# Patient Record
Sex: FEMALE | Race: WHITE | NOT HISPANIC OR LATINO | Employment: OTHER | URBAN - NONMETROPOLITAN AREA
[De-identification: names, ages, dates, MRNs, and addresses within clinical notes are randomized per-mention and may not be internally consistent; named-entity substitution may affect disease eponyms.]

---

## 2017-03-07 ENCOUNTER — COMMUNICATION - GICH (OUTPATIENT)
Dept: FAMILY MEDICINE | Facility: OTHER | Age: 32
End: 2017-03-07

## 2017-03-07 DIAGNOSIS — B85.0 PEDICULOSIS DUE TO PEDICULUS HUMANUS CAPITIS: ICD-10-CM

## 2017-03-10 ENCOUNTER — COMMUNICATION - GICH (OUTPATIENT)
Dept: FAMILY MEDICINE | Facility: OTHER | Age: 32
End: 2017-03-10

## 2017-03-10 DIAGNOSIS — B85.0 PEDICULOSIS DUE TO PEDICULUS HUMANUS CAPITIS: ICD-10-CM

## 2017-03-13 ENCOUNTER — COMMUNICATION - GICH (OUTPATIENT)
Dept: FAMILY MEDICINE | Facility: OTHER | Age: 32
End: 2017-03-13

## 2017-03-13 DIAGNOSIS — B85.0 PEDICULOSIS DUE TO PEDICULUS HUMANUS CAPITIS: ICD-10-CM

## 2017-09-20 ENCOUNTER — HISTORY (OUTPATIENT)
Dept: FAMILY MEDICINE | Facility: OTHER | Age: 32
End: 2017-09-20

## 2017-09-20 ENCOUNTER — OFFICE VISIT - GICH (OUTPATIENT)
Dept: FAMILY MEDICINE | Facility: OTHER | Age: 32
End: 2017-09-20

## 2017-09-20 DIAGNOSIS — B97.89 OTHER VIRAL AGENTS AS THE CAUSE OF DISEASES CLASSIFIED ELSEWHERE: ICD-10-CM

## 2017-09-20 DIAGNOSIS — J06.9 ACUTE UPPER RESPIRATORY INFECTION: ICD-10-CM

## 2017-12-28 NOTE — PROGRESS NOTES
Patient Information     Patient Name MRN Cassandra Kelly [id number] Female 1985      Progress Notes by Karen Alicea NP at 2017 11:00 AM     Author:  Karen Alicea NP Service:  (none) Author Type:  PHYS- Nurse Practitioner     Filed:  2017 11:39 AM Encounter Date:  2017 Status:  Signed     :  Karen Alicea NP (PHYS- Nurse Practitioner)            Nursing Notes:   Ana Lake  2017 11:34 AM  Signed  Patient presents today for sore throat that started this morning.  Ana Lake LPN..............2017 11:19 AM    SUBJECTIVE:    Cassandra Kelly is a 32 y.o. female who presents for sore throat started this AM    Pharyngitis    This is a new problem. The current episode started today. The problem has been unchanged. Neither side of throat is experiencing more pain than the other. There has been no fever. The pain is mild. Associated symptoms include congestion. Pertinent negatives include no coughing, diarrhea, ear discharge, ear pain, headaches, hoarse voice, plugged ear sensation, neck pain, shortness of breath, stridor, swollen glands, trouble swallowing or vomiting. She has had no exposure to strep or mono. She has tried NSAIDs and cool liquids for the symptoms. The treatment provided significant relief.       No current outpatient prescriptions on file prior to visit.     No current facility-administered medications on file prior to visit.     and   Patient Active Problem List       Diagnosis  Date Noted     Family history of thyroid cancer  2016     Back pain  2013     Left 5 s1 disk        I U D REMOVAL/CHECK  2011     ALLERGIC RHINITIS  2010     INSERTION OF INTRAUTERINE CONTRACEPTIVE DEVICE  2010       REVIEW OF SYSTEMS:  Review of Systems   HENT: Positive for congestion. Negative for ear discharge, ear pain, hoarse voice and trouble swallowing.    Respiratory: Negative for cough, shortness of breath and  "stridor.    Gastrointestinal: Negative for diarrhea and vomiting.   Musculoskeletal: Negative for neck pain.   Neurological: Negative for headaches.       OBJECTIVE:  /68  Pulse 72  Temp 98.3  F (36.8  C) (Oral)  Ht 1.797 m (5' 10.75\")  Wt 82.1 kg (181 lb)  LMP  (LMP Unknown)  Breastfeeding? No  BMI 25.42 kg/m2    EXAM:   Physical Exam   Constitutional: She is oriented to person, place, and time and well-developed, well-nourished, and in no distress.   HENT:   Head: Normocephalic and atraumatic.   Right Ear: Tympanic membrane and ear canal normal.   Left Ear: Tympanic membrane and ear canal normal.   Nose: Rhinorrhea present.   Mouth/Throat: Uvula is midline and mucous membranes are normal. Posterior oropharyngeal erythema present. No oropharyngeal exudate or posterior oropharyngeal edema.   Eyes: Conjunctivae are normal.   Neck: Neck supple.   Cardiovascular: Normal rate, regular rhythm and normal heart sounds.    Pulmonary/Chest: Effort normal and breath sounds normal. No respiratory distress. She has no wheezes. She has no rales.   Lymphadenopathy:     She has no cervical adenopathy.   Neurological: She is alert and oriented to person, place, and time.   Skin: Skin is warm and dry. No rash noted.   Psychiatric: Mood and affect normal.   Nursing note and vitals reviewed.      ASSESSMENT/PLAN:    ICD-10-CM    1. Viral URI J06.9      B97.89         Plan:  We discussed s/s and what's going around the community. Given she just woke up with the sore throat, advised otc. Likely viral. Home cares and OTC gone over. Symptoms likely due to virus. No antibiotic is needed at this time. Symptoms typically worse on days 2-5 and then stabilize and you are sick for days 5-12. Days 12-14 there is slow resolution and if there is a cough, studies show it can linger longer, however one is not as ill as in the beginning. If symptoms begin worsening or fail to improve after 14 days, return to clinic for reevaluation. All " questions were answered and she is in agreement with plan.       DAVID ALBARRAN NP ....................  9/20/2017   11:39 AM

## 2017-12-29 NOTE — PATIENT INSTRUCTIONS
Patient Information     Patient Name MRCassandra Rosario [id number] Female 1985      Patient Instructions by Karen Alicea NP at 2017 11:00 AM     Author:  Karen Alicea NP Service:  (none) Author Type:  PHYS- Nurse Practitioner     Filed:  2017 11:34 AM Encounter Date:  2017 Status:  Signed     :  Karen Alicea NP (PHYS- Nurse Practitioner)            Cold Medicines   What are cold medicines?  Symptoms of the common cold start gradually over several days and usually last about two weeks. Symptoms may include sneezing, a stuffy or runny nose, sore throat, cough, watery eyes, mild headache, or body aches. A cold will go away on its own without treatment. However, there are many nonprescription products that may help relieve some of the symptoms of a cold. Cold medicines often contain more than one ingredient and are used to treat more than one symptom. Read the labels and buy products that have only the ingredients that you need. If you are not sure which medicine is best, ask your pharmacist.  How do they work?  Decongestants reduce swelling in your nose and sinuses. They may also lessen the amount of mucus made by your nose. If you use decongestants more often than directed, your stuffy nose may get worse.   Antihistamines block the effect of histamine. Histamine is a chemical your body makes when you have an allergic reaction. Antihistamines are most often used to treat itchy or watery eyes or a stuffy or runny nose caused by an allergy. Antihistamines may not help a stuffy or runny nose caused by a cold because they can make mucus thick and dry.  Mucolytics are medicines that make mucus thinner so that it is easier to cough up out of your throat and lungs.  Expectorants are cough medicines that may help to keep the mucus thin and bring up mucus from the lungs when you cough. This may relieve chest congestion and make it easier to breathe.   Cough suppressants  (antitussives) are medicines that lessen the urge to cough. They may give relief from a dry, hacking cough. If you have a cough that is wet sounding and produces mucus, it is important for you to cough the mucus up out of your lungs. For this reason, cough suppressants are not recommended for a wet sounding cough.  Fever and pain relievers, such as acetaminophen, aspirin, or other nonsteroidal anti-inflammatory drugs (NSAIDs), are often included in cold medicine. Read labels carefully to avoid taking more medicine than you need.  What else do I need to know about this medicine?    Talk to your healthcare provider if your symptoms start suddenly or you have severe symptoms. This may mean you have something more serious than a cold.    Follow the directions that come with your medicine, including information about food or alcohol. Make sure you know how and when to take your medicine. Do not take more or less than you are supposed to take.    Try to get all of your medicine at the same place. Your pharmacist can help make sure that all of your medicines are safe to take together.    Keep a list of your medicines with you. List all of the prescription medicines, nonprescription medicines, supplements, natural remedies, and vitamins that you take. Tell all healthcare providers who treat you about all of the products you are taking.    Many medicines have side effects. A side effect is a symptom or problem that is caused by the medicine. Ask your healthcare provider or pharmacist what side effects the medicine may cause and what you should do if you have side effects.    Nonsteroidal anti-inflammatory medicines (NSAIDs), such as ibuprofen, naproxen, and aspirin, may cause stomach bleeding and other problems. These risks increase with age. Read the label and take as directed. Unless recommended by your healthcare provider, do not take for more than 10 days for any reason.    Acetaminophen may cause liver damage or other  problems. Unless recommended by your provider, don't take more than 3000 milligrams (mg) in 24 hours. To make sure you don t take too much, check other medicines you take to see if they also contain acetaminophen. Ask your provider if you need to avoid drinking alcohol while taking this medicine.  If you have any questions, ask your healthcare provider or pharmacist for more information. Be sure to keep all appointments for provider visits or tests.      Symptoms likely due to virus. No antibiotic is needed at this time. Symptoms typically worse on days 2-5 and then stabilize and you are sick for days 5-12. Days 12-14 there is slow resolution and if there is a cough, studies show it can linger longer, however one is not as ill as in the beginning. If symptoms begin worsening or fail to improve after 14 days, return to clinic for reevaluation.

## 2017-12-30 NOTE — NURSING NOTE
Patient Information     Patient Name MRN Cassandra Kelly [id number] Female 1985      Nursing Note by Ana Lake at 2017 11:00 AM     Author:  Ana Lake Service:  (none) Author Type:  (none)     Filed:  2017 11:34 AM Encounter Date:  2017 Status:  Signed     :  Ana Lake            Patient presents today for sore throat that started this morning.  Ana Lake LPN..............2017 11:19 AM

## 2018-01-03 NOTE — TELEPHONE ENCOUNTER
Patient Information     Patient Name MRCassandra Rosario [id number] Female 1985      Telephone Encounter by Eva Cee at 3/10/2017  4:11 PM     Author:  Eva Cee Service:  (none) Author Type:  (none)     Filed:  3/10/2017  4:14 PM Encounter Date:  3/10/2017 Status:  Signed     :  Eva Cee            Per [employer] the Lindane is not covered by her insurance. Either Permethrin or Piperonyl Butoxide/Pyrethrin are covered by Care. Would you like to switch?

## 2018-01-03 NOTE — TELEPHONE ENCOUNTER
Patient Information     Patient Name MRN Cassandra Kelly [id number] Female 1985      Telephone Encounter by Belgica Kennedy MD at 3/10/2017  4:17 PM     Author:  Belgica Kennedy MD Service:  (none) Author Type:  Physician     Filed:  3/10/2017  4:19 PM Encounter Date:  3/10/2017 Status:  Signed     :  Belgica Kennedy MD (Physician)            She has already tried permethrin, which didn't work.  I sent a prescription for the Rid (Pyrethrins-piperonyl butoxide) instead.  If this doesn't work, will need to do a prior authorization to try to get lindane instead.  Belgica Kennedy MD ....................  3/10/2017   4:18 PM

## 2018-01-03 NOTE — TELEPHONE ENCOUNTER
Patient Information     Patient Name MRN Cassandra Kelly [id number] Female 1985      Telephone Encounter by Eva Cee at 3/10/2017  4:23 PM     Author:  Eva Cee Service:  (none) Author Type:  (none)     Filed:  3/10/2017  4:24 PM Encounter Date:  3/10/2017 Status:  Signed     :  Eva Cee            Patient notified of Dr Miles Velásquez's response.

## 2018-01-03 NOTE — TELEPHONE ENCOUNTER
Patient Information     Patient Name MRN Cassandra Kelly [id number] Female 1985      Telephone Encounter by Meenu Leo at 3/10/2017  9:49 AM     Author:  Meenu Leo Service:  (none) Author Type:  (none)     Filed:  3/10/2017  9:52 AM Encounter Date:  3/7/2017 Status:  Signed     :  Meenu Leo            After birth date was verified, patient notified that prescription was sent in.  Answered her questions, but she wanted enough to treat multiple times.  I told her this was not ordered this way.  She needs to get help to pick the nits, states she doesn't have help.  Encouraged her to ask a friend for help.  Needs to bag and freeze, treat, wash, or replace items in house.  Meenu Leo [facility] ([facility])................ 3/10/2017 9:52 AM

## 2018-01-03 NOTE — TELEPHONE ENCOUNTER
Patient Information     Patient Name MRN Cassandra Kelly [id number] Female 1985      Telephone Encounter by Belgica Kennedy MD at 3/10/2017  9:36 AM     Author:  Belgica Kennedy MD Service:  (none) Author Type:  Physician     Filed:  3/10/2017  9:36 AM Encounter Date:  3/7/2017 Status:  Signed     :  Belgica Kennedy MD (Physician)            Prescription for Lindane shampoo sent to [facility].  Belgica Kennedy MD ....................  3/10/2017   9:36 AM

## 2018-01-03 NOTE — TELEPHONE ENCOUNTER
Patient Information     Patient Name MRN Cassandra Kelly [id number] Female 1985      Telephone Encounter by Meenu Leo at 3/13/2017 11:43 AM     Author:  Meenu Leo Service:  (none) Author Type:  (none)     Filed:  3/13/2017 12:04 PM Encounter Date:  3/13/2017 Status:  Signed     :  Meenu Leo            Call placed to [facility] to check on the status of the prescription sent in today.  The RID went through last week, but the patient did not pick it up.  The 5% only comes in a topical cream.  OTC permethrin is 1%.  They will contact the patient to  the RID and we will see how that treatment works.  Meenu Leo CMA ([facility])................ 3/13/2017 12:04 PM

## 2018-01-03 NOTE — TELEPHONE ENCOUNTER
Patient Information     Patient Name Cassandra Moran [id number] Female 1985      Telephone Encounter by Meenu Leo at 3/13/2017  8:38 AM     Author:  Meenu Leo Service:  (none) Author Type:  (none)     Filed:  3/13/2017  8:40 AM Encounter Date:  3/13/2017 Status:  Signed     :  Meenu Leo            Fax from [facility] that RID is not being covered, insurance wants permethrin 5% used first.  Per previous phone note, Belgica Kennedy MD would like to do a PA.  Form in in box.  Meenu Leo CMA ([facility])................ 3/13/2017 8:40 AM

## 2018-01-03 NOTE — TELEPHONE ENCOUNTER
"Patient Information     Patient Name MRN Cassandra Kelly [id number] Female 1985      Telephone Encounter by Sandra Devries RN at 3/7/2017  9:38 AM     Author:  Sandra Devries RN Service:  (none) Author Type:  NURS- Registered Nurse     Filed:  3/7/2017  9:47 AM Encounter Date:  3/7/2017 Status:  Signed     :  Sandra Devries RN (NURS- Registered Nurse)            Pt requests physician consideration and a callback today please    Sandra Devries RN ....................  3/7/2017   9:46 AM  Answer Assessment - Initial Assessment Questions  1. LICE APPEARANCE: \"Have you seen any lice?\" If so, ask: \"What do they look like?\" (Correct answer: A gray bug that's 1/16 inch or 2 millimeters long)       My children don't have head lice anymore. I have been dealing with this since December. There is nobody to pick the nits out of my hair. I have the comb but I can't get them all. I have tried all the OTC treatments and wash all the bedding. Is there a prescription strength treatment that can be called into [facility]?  2. NITS APPEARANCE: \"Have you seen any eggs (nits) in the hair?\" If so, ask: \"What do they look like?\" (Correct answer: white or tan eggs attached to hair shafts)      Yes I have the nits-- my kids don't anymore  3. ONSET: \"How long have the eggs been present?\"       Nits   4. ITCH: \"Is the scalp itchy?\" If so, ask: \"How bad is the itch?\"       Yes - not horrible   5. RASH: \"Is there a rash?\" If so, ask: \"What does it look like?\"       no  6. TREATMENT:  What treatment have you tried?  What happened?      OTC- Nix Ultra, Lice shampoo, Lice free,  7. PREGNANCY-BREASTFEEDING: \"Is there any chance you are pregnant?\" \"When was your last menstrual period?\" \"Are you breastfeeding?\"      No    Protocols used: ADULT HEAD LICE-A-AH            "

## 2018-01-03 NOTE — TELEPHONE ENCOUNTER
Patient Information     Patient Name MRCassandra Rosario [id number] Female 1985      Telephone Encounter by Belgica Kennedy MD at 3/13/2017 11:18 AM     Author:  Belgica Kennedy MD Service:  (none) Author Type:  Physician     Filed:  3/13/2017 11:21 AM Encounter Date:  3/13/2017 Status:  Signed     :  Belgica Kennedy MD (Physician)            I had sent in a prescription for the RID (pyrethrins-piperonyl butoxide) last week as she has already tried over the counter permethrin, which wasn't effective.  I was told that either of these would be covered.  I sent in a prescription for the permethrin 5% as well as I don't know what the strength of the over the counter version was that she tried.  If this was also 5%, I would recommend that the try the RID that was sent in last week.  If this also doesn't work, I will do a prior authorization for the Lindane.  Put prior authorization for Lindane back in outbox and did not complete it.  Belgica Kennedy MD ....................  3/13/2017   11:21 AM

## 2018-01-26 VITALS
DIASTOLIC BLOOD PRESSURE: 68 MMHG | SYSTOLIC BLOOD PRESSURE: 112 MMHG | TEMPERATURE: 98.3 F | BODY MASS INDEX: 25.34 KG/M2 | HEIGHT: 71 IN | HEART RATE: 72 BPM | WEIGHT: 181 LBS

## 2018-02-16 ENCOUNTER — DOCUMENTATION ONLY (OUTPATIENT)
Dept: FAMILY MEDICINE | Facility: OTHER | Age: 33
End: 2018-02-16

## 2018-03-23 ENCOUNTER — OFFICE VISIT (OUTPATIENT)
Dept: FAMILY MEDICINE | Facility: OTHER | Age: 33
End: 2018-03-23
Attending: FAMILY MEDICINE
Payer: COMMERCIAL

## 2018-03-23 VITALS
HEIGHT: 71 IN | HEART RATE: 76 BPM | BODY MASS INDEX: 25.2 KG/M2 | WEIGHT: 180 LBS | DIASTOLIC BLOOD PRESSURE: 80 MMHG | SYSTOLIC BLOOD PRESSURE: 118 MMHG

## 2018-03-23 DIAGNOSIS — Z83.438 FAMILY HISTORY OF HYPERLIPIDEMIA: ICD-10-CM

## 2018-03-23 DIAGNOSIS — Z13.0 SCREENING, DEFICIENCY ANEMIA, IRON: ICD-10-CM

## 2018-03-23 DIAGNOSIS — Z20.7 EXPOSURE TO HEAD LICE: ICD-10-CM

## 2018-03-23 DIAGNOSIS — Z80.8 FAMILY HISTORY OF THYROID CANCER: ICD-10-CM

## 2018-03-23 DIAGNOSIS — Z00.00 HEALTH CARE MAINTENANCE: Primary | ICD-10-CM

## 2018-03-23 LAB
ANION GAP SERPL CALCULATED.3IONS-SCNC: 6 MMOL/L (ref 3–14)
BASOPHILS # BLD AUTO: 0.1 10E9/L (ref 0–0.2)
BASOPHILS NFR BLD AUTO: 0.6 %
BUN SERPL-MCNC: 16 MG/DL (ref 7–25)
CALCIUM SERPL-MCNC: 9.5 MG/DL (ref 8.6–10.3)
CHLORIDE SERPL-SCNC: 103 MMOL/L (ref 98–107)
CHOLEST SERPL-MCNC: 182 MG/DL
CO2 SERPL-SCNC: 27 MMOL/L (ref 21–31)
CREAT SERPL-MCNC: 0.71 MG/DL (ref 0.6–1.2)
DIFFERENTIAL METHOD BLD: NORMAL
EOSINOPHIL # BLD AUTO: 0.1 10E9/L (ref 0–0.7)
EOSINOPHIL NFR BLD AUTO: 1.1 %
ERYTHROCYTE [DISTWIDTH] IN BLOOD BY AUTOMATED COUNT: 13 % (ref 10–15)
GFR SERPL CREATININE-BSD FRML MDRD: >90 ML/MIN/1.7M2
GLUCOSE SERPL-MCNC: 102 MG/DL (ref 70–105)
HCT VFR BLD AUTO: 39.7 % (ref 35–47)
HDLC SERPL-MCNC: 60 MG/DL (ref 23–92)
HGB BLD-MCNC: 12.9 G/DL (ref 11.7–15.7)
IMM GRANULOCYTES # BLD: 0 10E9/L (ref 0–0.4)
IMM GRANULOCYTES NFR BLD: 0.3 %
LDLC SERPL CALC-MCNC: 112 MG/DL
LYMPHOCYTES # BLD AUTO: 2.2 10E9/L (ref 0.8–5.3)
LYMPHOCYTES NFR BLD AUTO: 27.5 %
MCH RBC QN AUTO: 26.5 PG (ref 26.5–33)
MCHC RBC AUTO-ENTMCNC: 32.5 G/DL (ref 31.5–36.5)
MCV RBC AUTO: 82 FL (ref 78–100)
MONOCYTES # BLD AUTO: 0.3 10E9/L (ref 0–1.3)
MONOCYTES NFR BLD AUTO: 4.2 %
NEUTROPHILS # BLD AUTO: 5.2 10E9/L (ref 1.6–8.3)
NEUTROPHILS NFR BLD AUTO: 66.3 %
NONHDLC SERPL-MCNC: 122 MG/DL
PLATELET # BLD AUTO: 346 10E9/L (ref 150–450)
POTASSIUM SERPL-SCNC: 4.1 MMOL/L (ref 3.5–5.1)
RBC # BLD AUTO: 4.86 10E12/L (ref 3.8–5.2)
SODIUM SERPL-SCNC: 136 MMOL/L (ref 134–144)
TRIGL SERPL-MCNC: 50 MG/DL
TSH SERPL DL<=0.05 MIU/L-ACNC: 2.59 IU/ML (ref 0.34–5.6)
WBC # BLD AUTO: 7.9 10E9/L (ref 4–11)

## 2018-03-23 PROCEDURE — 85025 COMPLETE CBC W/AUTO DIFF WBC: CPT | Performed by: FAMILY MEDICINE

## 2018-03-23 PROCEDURE — 80061 LIPID PANEL: CPT | Performed by: FAMILY MEDICINE

## 2018-03-23 PROCEDURE — 36415 COLL VENOUS BLD VENIPUNCTURE: CPT | Performed by: FAMILY MEDICINE

## 2018-03-23 PROCEDURE — 84443 ASSAY THYROID STIM HORMONE: CPT | Performed by: FAMILY MEDICINE

## 2018-03-23 PROCEDURE — 99395 PREV VISIT EST AGE 18-39: CPT | Performed by: FAMILY MEDICINE

## 2018-03-23 PROCEDURE — 80048 BASIC METABOLIC PNL TOTAL CA: CPT | Performed by: FAMILY MEDICINE

## 2018-03-23 ASSESSMENT — ENCOUNTER SYMPTOMS
COUGH: 0
RHINORRHEA: 0
FEVER: 0
FATIGUE: 0

## 2018-03-23 NOTE — PROGRESS NOTES
SUBJECTIVE:   Cassandra Kelly is a 33 year old female who presents to clinic today for a physical.  She has a family history of hyperlipidemia (dad) and thyroid cancer (mom) and would like to have some screening labs.  When she has donated blood in the past year or two, she has occasionally been turned away due to borderline hemoglobin.  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      Some of her kids have recently had head lice and she asks if I would look in her hair to see if she might have it as well.  She has occasionally had some itching of her scalp, but nothing significant recently.    HPI      Patient Active Problem List    Diagnosis Date Noted     Family history of hyperlipidemia 2018     Priority: Medium     Family history of thyroid cancer 2016     Priority: Medium     Backache 2013     Priority: Medium     Overview:   Left 5 s1 disk       Allergic rhinitis 2010     Priority: Medium     Past Medical History:   Diagnosis Date     Personal history of other medical treatment (CODE)       x 3      Past Surgical History:   Procedure Laterality Date     BACK SURGERY      No Comments Provided     DILATION AND CURETTAGE      for miscarage     TUBAL LIGATION  2015    Bilateral, also removal of intrauterine device     Family History   Problem Relation Age of Onset     CANCER Mother      Cancer,thyroid     Hypertension Mother      Hypertension     Hypertension Father      Hypertension     Family History Negative Sister      Good Health     Family History Negative Brother      Good Health,2 1/2 brothers, both in good health     Social History   Substance Use Topics     Smoking status: Never Smoker     Smokeless tobacco: Never Used     Alcohol use 1.8 oz/week     Social History     Social History Narrative    . 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     No current outpatient  "prescriptions on file.     Allergies   Allergen Reactions     Sulfa Drugs Rash     Had with chicken pox as child and they got worse       Review of Systems   Constitutional: Negative for fatigue and fever.   HENT: Negative for rhinorrhea.    Respiratory: Negative for cough.    Psychiatric/Behavioral: Negative for mood changes.        OBJECTIVE:     /80 (BP Location: Right arm, Patient Position: Sitting, Cuff Size: Adult Regular)  Pulse 76  Ht 5' 10.5\" (1.791 m)  Wt 180 lb (81.6 kg)  BMI 25.46 kg/m2  Body mass index is 25.46 kg/(m^2).  Physical Exam   Constitutional: She is oriented to person, place, and time. She appears well-developed and well-nourished. No distress.   HENT:   Head: Normocephalic.   Right Ear: Tympanic membrane and external ear normal.   Left Ear: Tympanic membrane and external ear normal.   Nose: Nose normal.   Mouth/Throat: Oropharynx is clear and moist. No oropharyngeal exudate.   Eyes: Conjunctivae are normal. Pupils are equal, round, and reactive to light. Right eye exhibits no discharge. Left eye exhibits no discharge.   Neck: Neck supple. No tracheal deviation present. No thyromegaly present.   Cardiovascular: Normal rate, regular rhythm, S1 normal, S2 normal, normal heart sounds, intact distal pulses and normal pulses.  Exam reveals no gallop, no S3, no S4 and no friction rub.    No murmur heard.  Pulmonary/Chest: Effort normal and breath sounds normal. No respiratory distress. She has no wheezes. She has no rales.   Breast exam:  No masses palpable.  No skin changes, tethering or axillary lymphadenopathy.   Abdominal: Soft. Bowel sounds are normal. She exhibits no distension and no mass. There is no hepatosplenomegaly. There is no tenderness.   Genitourinary: No breast swelling, tenderness or discharge.   Genitourinary Comments: Pelvic/Rectal exams deferred per patient.   Musculoskeletal: Normal range of motion. She exhibits no edema.   Lymphadenopathy:     She has no cervical " adenopathy.   Neurological: She is alert and oriented to person, place, and time. She has normal strength and normal reflexes. She exhibits normal muscle tone.   Skin: Skin is warm and dry. No rash noted.   No lice or nits noted in hair/scalp.   Psychiatric: She has a normal mood and affect. Judgment and thought content normal. Cognition and memory are normal.       PHQ-2 Score:     PHQ-2 ( 1999 [employer]) 3/23/2018   Q1: Little interest or pleasure in doing things 0   Q2: Feeling down, depressed or hopeless 0   PHQ-2 Score 0         Diagnostic Test Results:  none     ASSESSMENT/PLAN:       ICD-10-CM    1. Health care maintenance Z00.00    2. Family history of hyperlipidemia Z83.49 Lipid Profile     Basic metabolic panel  (Ca, Cl, CO2, Creat, Gluc, K, Na, BUN)   3. Family history of thyroid cancer Z80.8 TSH GH   4. Screening, deficiency anemia, iron Z13.0 CBC with platelets and differential   5. Exposure to head lice Z20.7        1.  Pap Smear is up to date (2016).  Since last Pap Smear and HPV were noted, 5 year follow up would be recommended.  Labs as noted below.  Vaccines are up to date.  2.  Labs as above.  3.  Labs as above.  4.  Complete Blood Count as above.  5.  No evidence for active infestation at this time.    Belgica Kennedy MD  [facility]

## 2018-03-23 NOTE — MR AVS SNAPSHOT
"              After Visit Summary   3/23/2018    Cassandra Kelly    MRN: [id number]           Patient Information     Date Of Birth          1985        Visit Information        Provider Department      3/23/2018 11:00 AM Belgica Kennedy MD [facility]        Today's [facility]     Health care maintenance    -  1    Family history of hyperlipidemia        Family history of thyroid cancer        Screening, deficiency anemia, iron           Follow-ups after your visit        Who to contact     If you have questions or need follow up information about today's clinic visit or your schedule please contact [facility] AND [facility] directly at [phone].  Normal or non-critical lab and imaging results will be communicated to you by [employer], letter or phone within 4 business days after the clinic has received the results. If you do not hear from us within 7 days, please contact the clinic through [employer] or phone. If you have a critical or abnormal lab result, we will notify you by phone as soon as possible.  Submit refill requests through [employer] or call your pharmacy and they will forward the refill request to us. Please allow 3 business days for your refill to be completed.          Additional Information About Your Visit        MyChart Information     [employer] lets you send messages to your doctor, view your test results, renew your prescriptions, schedule appointments and more. To sign up, go to [email or web address] . Click on \"Log in\" on the left side of the screen, which will take you to the Welcome page. Then click on \"Sign up Now\" on the right side of the page.     You will be asked to enter the access code listed below, as well as some personal information. Please follow the directions to create your username and password.     Your access code is: [email or web address]  Expires: 2018 11:33 AM     Your access code will  in 90 days. If you need help or a new code, " "please call your [facility] clinic or [phone].        Care EveryWhere ID     This is your Care EveryWhere ID. This could be used by other organizations to access your [facility] medical records  [id number]        Your Vitals Were     Pulse Height BMI (Body Mass Index)             76 5' 10.5\" (1.791 m) 25.46 kg/m2          Blood Pressure from Last 3 Encounters:   03/23/18 118/80   09/20/17 112/68   01/25/16 132/80    Weight from Last 3 Encounters:   03/23/18 180 lb (81.6 kg)   09/20/17 181 lb (82.1 kg)   01/25/16 181 lb (82.1 kg)              We Performed the Following     Basic metabolic panel  (Ca, Cl, CO2, Creat, Gluc, K, Na, BUN)     CBC with platelets and differential     Lipid Profile     [facility]        Primary Care Provider Office Phone # Fax #    Belgica Tressa Kennedy [phone]       [address] [facility] [address]        Equal Access to Services     [facility] Center[facility]ARRISON : Hadii aad ku hadasho Soomaali, waaxda luqadaha, qaybta kaalmada adeegyalester, ej fuentes . So [facility] [phone].    ATENCIÓN: Si habla español, tiene a doshi disposición servicios gratuitos de asistencia lingüística. Llame al [phone].    We comply with applicable federal civil rights laws and [place] laws. We do not discriminate on the basis of race, color, national origin, age, disability, sex, sexual orientation, or gender identity.            Thank you!     Thank you for choosing [facility] AND [facility]  for your care. Our goal is always to provide you with excellent care. Hearing back from our patients is one way we can continue to improve our services. Please take a few minutes to complete the written survey that you may receive in the mail after your visit with us. Thank you!             Your Updated Medication List - Protect others around you: Learn how to safely use, store and throw away your medicines at www.disposemymeds.org.      Notice  As of 3/23/2018 11:33 AM "    You have not been prescribed any medications.

## 2018-03-23 NOTE — LETTER
Cassandra Kelly  [address]  [place] [address]    3/29/2018      Dear Ms. Kelly,      I wanted to let you know about your recent labs.  Your LDL cholesterol was mildly elevated, but the remainder of your lipids were in good range.  Healthy eating and exercise would be recommended.    All of your other labs were normal.    Please contact us at [phone] with any questions or concerns that you have.    I have attached your lab results for your records.        Sincerely,         Belgica Kenneyd     Resulted Orders   TSH GH   Result Value Ref Range    Thyrotropin 2.59 0.34 - 5.60 IU/mL   Lipid Profile   Result Value Ref Range    Cholesterol 182 <200 mg/dL    Triglycerides 50 <150 mg/dL    HDL Cholesterol 60 23 - 92 mg/dL    LDL Cholesterol Calculated 112 (H) <100 mg/dL      Comment:      Above desirable:  100-129 mg/dl  Borderline High:  130-159 mg/dL  High:             160-189 mg/dL  Very high:       >189 mg/dl      Non HDL Cholesterol 122 <130 mg/dL   Basic metabolic panel  (Ca, Cl, CO2, Creat, Gluc, K, Na, BUN)   Result Value Ref Range    Sodium 136 134 - 144 mmol/L    Potassium 4.1 3.5 - 5.1 mmol/L    Chloride 103 98 - 107 mmol/L    Carbon Dioxide 27 21 - 31 mmol/L    Anion Gap 6 3 - 14 mmol/L    Glucose 102 70 - 105 mg/dL    Urea Nitrogen 16 7 - 25 mg/dL    Creatinine 0.71 0.60 - 1.20 mg/dL    GFR Estimate >90 >60 mL/min/1.7m2    GFR Estimate If Black >90 >60 mL/min/1.7m2    Calcium 9.5 8.6 - 10.3 mg/dL   CBC with platelets and differential   Result Value Ref Range    WBC 7.9 4.0 - 11.0 10e9/L    RBC Count 4.86 3.8 - 5.2 10e12/L    Hemoglobin 12.9 11.7 - 15.7 g/dL    Hematocrit 39.7 35.0 - 47.0 %    MCV 82 78 - 100 fl    MCH 26.5 26.5 - 33.0 pg    MCHC 32.5 31.5 - 36.5 g/dL    RDW 13.0 10.0 - 15.0 %    Platelet Count 346 150 - 450 10e9/L    Diff Method Automated Method     % Neutrophils 66.3 %    % Lymphocytes 27.5 %    % Monocytes 4.2 %    % Eosinophils 1.1 %    % Basophils 0.6 %    % Immature  Granulocytes 0.3 %    Absolute Neutrophil 5.2 1.6 - 8.3 10e9/L    Absolute Lymphocytes 2.2 0.8 - 5.3 10e9/L    Absolute Monocytes 0.3 0.0 - 1.3 10e9/L    Absolute Eosinophils 0.1 0.0 - 0.7 10e9/L    Absolute Basophils 0.1 0.0 - 0.2 10e9/L    Abs Immature Granulocytes 0.0 0 - 0.4 10e9/L

## 2018-11-30 ENCOUNTER — OFFICE VISIT (OUTPATIENT)
Dept: FAMILY MEDICINE | Facility: OTHER | Age: 33
End: 2018-11-30
Attending: NURSE PRACTITIONER
Payer: COMMERCIAL

## 2018-11-30 VITALS
SYSTOLIC BLOOD PRESSURE: 122 MMHG | HEART RATE: 76 BPM | BODY MASS INDEX: 24.63 KG/M2 | DIASTOLIC BLOOD PRESSURE: 76 MMHG | WEIGHT: 174.13 LBS

## 2018-11-30 DIAGNOSIS — N64.4 BREAST PAIN, LEFT: Primary | ICD-10-CM

## 2018-11-30 DIAGNOSIS — R00.0 RACING HEART BEAT: ICD-10-CM

## 2018-11-30 PROCEDURE — 93010 ELECTROCARDIOGRAM REPORT: CPT | Performed by: INTERNAL MEDICINE

## 2018-11-30 PROCEDURE — 99214 OFFICE O/P EST MOD 30 MIN: CPT | Performed by: NURSE PRACTITIONER

## 2018-11-30 PROCEDURE — G0463 HOSPITAL OUTPT CLINIC VISIT: HCPCS | Mod: 25

## 2018-11-30 PROCEDURE — 93005 ELECTROCARDIOGRAM TRACING: CPT | Performed by: NURSE PRACTITIONER

## 2018-11-30 PROCEDURE — G0463 HOSPITAL OUTPT CLINIC VISIT: HCPCS

## 2018-11-30 ASSESSMENT — PAIN SCALES - GENERAL: PAINLEVEL: NO PAIN (0)

## 2018-11-30 NOTE — PROGRESS NOTES
HPI:    Cassandra Kelly is a 33 year old female who presents to clinic today for multiple concerns.     She has had left side breast pain for 2 months. Has changed bras to see if this helps. Pain has been steady. Pain is dull and gradually worsening. No changes with period. She has done a breast exam. Feels she may be having some firmness to her breast. No fevers. No redness or swelling. No nipple discharge. No breast cancer in the family.     Also has had racing heart on and off for the past 8 months. Notices this is worse/more noticeable with stress. She is very busy in the summer and notices this more during this time. She has the episodes and will rest and this settles down. Has not had any episodes over past 3 weeks. When she did have the episodes over the summer this would happened 1 time a week. No FH of heart disease.     Past Medical History:   Diagnosis Date     Personal history of other medical treatment (CODE)       x 3       Past Surgical History:   Procedure Laterality Date     BACK SURGERY      No Comments Provided     DILATION AND CURETTAGE      for miscarage     TUBAL LIGATION  2015    Bilateral, also removal of intrauterine device       Family History   Problem Relation Age of Onset     Cancer Mother      Cancer,thyroid     Hypertension Mother      Hypertension     Hypertension Father      Hypertension     Family History Negative Sister      Good Health     Family History Negative Brother      Good Health,2 1/2 brothers, both in good health       Social History     Social History     Marital status:      Spouse name: N/A     Number of children: N/A     Years of education: N/A     Occupational History     Not on file.     Social History Main Topics     Smoking status: Never Smoker     Smokeless tobacco: Never Used     Alcohol use 1.8 oz/week     Drug use: No      Comment: Drug use: No     Sexual activity: Yes     Partners: Male     Birth control/ protection: Surgical      Other Topics Concern     Not on file     Social History Narrative    . 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       No current outpatient prescriptions on file.       Allergies   Allergen Reactions     Sulfa Drugs Rash     Had with chicken pox as child and they got worse       ROS:  Pertinent positives and negatives are noted in HPI.    EXAM:  General appearance: well appearing female, in no acute distress  Respiratory: clear to auscultation bilaterally  Cardiac: RRR with no murmurs  Breast: normal breast exam. Left breast slightly larger than right. No dimpling of skin, no nipple inversion, no nipple discharge  Psychological: normal affect, alert and pleasant    ASSESSMENT AND PLAN:    1. Breast pain, left    2. Racing heart beat      Left breast exam stable. Plan to have US and mammogram and follow-up as needed.   EKG is normal. Since sx are currently resolved, plan to follow-up with holter monitor if sx return. She will call to schedule this.       Mireya Kelly..................11/30/2018 8:30 AM

## 2018-11-30 NOTE — MR AVS SNAPSHOT
After Visit Summary   11/30/2018    Cassandra Kelly    MRN: [id number]           Patient Information     Date Of Birth          1985        Visit Information        Provider Department      11/30/2018 8:30 AM Mireya Kelly APRN CNP [facility]        Today's Diagnoses     Breast pain, left    -  1    Racing heart beat           Follow-ups after your visit        Future tests that were ordered for you today     Open Future Orders        Priority Expected Expires Ordered    MA Diagnostic Bilateral w/Natanael Routine  11/30/2019 11/30/2018    US Breast Left Limited 1-3 Quadrants Routine  11/30/2019 11/30/2018            Who to contact     If you have questions or need follow up information about today's clinic visit or your schedule please contact [facility] AND \[facility]\"" directly at [phone].  Normal or non-critical lab and imaging results will be communicated to you by MyChart, letter or phone within 4 business days after the clinic has received the results. If you do not hear from us within 7 days, please contact the clinic through MyChart or phone. If you have a critical or abnormal lab result, we will notify you by phone as soon as possible.  Submit refill requests through [employer] or call your pharmacy and they will forward the refill request to us. Please allow 3 business days for your refill to be completed.          Additional Information About Your Visit        Care EveryWhere ID     This is your Care EveryWhere ID. This could be used by other organizations to access your [facility] medical records  [id number]        Your Vitals Were     Pulse Last Period Breastfeeding? BMI (Body Mass Index)          76 11/15/2018 (Approximate) No 24.63 kg/m2         Blood Pressure from Last 3 Encounters:   11/30/18 122/76   03/23/18 118/80   09/20/17 112/68    Weight from Last 3 Encounters:   11/30/18 174 lb 2 oz (79 kg)   03/23/18 180 lb (81.6 kg)   09/20/17 181 lb (82.1  kg)              We Performed the Following     EKG 12-lead, tracing only        Primary Care Provider Office Phone # Fax #    Belgica Tressa Kennedy [phone] [address] [facility] [address]        Equal Access to Services     LAURIE YAN : Hadii porter hernandes hadelzao Soomaali, waaxda luqadaha, qaybta kaalmada adeegyada, ej porras hayradha meansxiomaragaye lund. So [facility] [phone].    ATENCIÓN: Si habla español, tiene a doshi disposición servicios gratuitos de asistencia lingüística. Llame al [phone].    We comply with applicable federal civil rights laws and [place] laws. We do not discriminate on the basis of race, color, national origin, age, disability, sex, sexual orientation, or gender identity.            Thank you!     Thank you for choosing [facility] AND [facility]  for your care. Our goal is always to provide you with excellent care. Hearing back from our patients is one way we can continue to improve our services. Please take a few minutes to complete the written survey that you may receive in the mail after your visit with us. Thank you!             Your Updated Medication List - Protect others around you: Learn how to safely use, store and throw away your medicines at www.disposemymeds.org.      Notice  As of 11/30/2018  9:16 AM    You have not been prescribed any medications.

## 2018-12-07 ENCOUNTER — HOSPITAL ENCOUNTER (OUTPATIENT)
Dept: MAMMOGRAPHY | Facility: OTHER | Age: 33
End: 2018-12-07
Attending: NURSE PRACTITIONER
Payer: COMMERCIAL

## 2018-12-07 ENCOUNTER — HOSPITAL ENCOUNTER (OUTPATIENT)
Dept: ULTRASOUND IMAGING | Facility: OTHER | Age: 33
Discharge: HOME | End: 2018-12-07
Attending: NURSE PRACTITIONER | Admitting: NURSE PRACTITIONER
Payer: COMMERCIAL

## 2018-12-07 DIAGNOSIS — N64.4 BREAST PAIN, LEFT: ICD-10-CM

## 2018-12-07 PROCEDURE — G0279 TOMOSYNTHESIS, MAMMO: HCPCS

## 2018-12-07 PROCEDURE — 76641 ULTRASOUND BREAST COMPLETE: CPT | Mod: LT

## 2019-06-04 ENCOUNTER — HOSPITAL ENCOUNTER (OUTPATIENT)
Dept: ULTRASOUND IMAGING | Facility: OTHER | Age: 34
Discharge: HOME | End: 2019-06-04
Attending: FAMILY MEDICINE | Admitting: FAMILY MEDICINE
Payer: COMMERCIAL

## 2019-06-04 ENCOUNTER — HOSPITAL ENCOUNTER (OUTPATIENT)
Dept: MAMMOGRAPHY | Facility: OTHER | Age: 34
End: 2019-06-04
Attending: FAMILY MEDICINE
Payer: COMMERCIAL

## 2019-06-04 DIAGNOSIS — R92.8 ABNORMAL MAMMOGRAM: ICD-10-CM

## 2019-06-04 DIAGNOSIS — Z09 FOLLOW-UP EXAM, 3-6 MONTHS SINCE PREVIOUS EXAM: ICD-10-CM

## 2019-06-04 DIAGNOSIS — R92.8 ABNORMAL FINDING ON BREAST IMAGING: ICD-10-CM

## 2019-06-04 PROCEDURE — 76642 ULTRASOUND BREAST LIMITED: CPT | Mod: LT

## 2019-06-04 PROCEDURE — G0279 TOMOSYNTHESIS, MAMMO: HCPCS

## 2020-10-12 ENCOUNTER — ALLIED HEALTH/NURSE VISIT (OUTPATIENT)
Dept: FAMILY MEDICINE | Facility: OTHER | Age: 35
End: 2020-10-12
Attending: FAMILY MEDICINE
Payer: COMMERCIAL

## 2020-10-12 DIAGNOSIS — R05.9 COUGH: ICD-10-CM

## 2020-10-12 DIAGNOSIS — R07.0 THROAT PAIN: Primary | ICD-10-CM

## 2020-10-12 PROCEDURE — 99207 PR NO CHARGE NURSE ONLY: CPT

## 2020-10-12 PROCEDURE — C9803 HOPD COVID-19 SPEC COLLECT: HCPCS | Performed by: FAMILY MEDICINE

## 2020-10-12 PROCEDURE — U0003 INFECTIOUS AGENT DETECTION BY NUCLEIC ACID (DNA OR RNA); SEVERE ACUTE RESPIRATORY SYNDROME CORONAVIRUS 2 (SARS-COV-2) (CORONAVIRUS DISEASE [COVID-19]), AMPLIFIED PROBE TECHNIQUE, MAKING USE OF HIGH THROUGHPUT TECHNOLOGIES AS DESCRIBED BY CMS-2020-01-R: HCPCS | Mod: ZL | Performed by: FAMILY MEDICINE

## 2020-10-13 LAB
SARS-COV-2 RNA SPEC QL NAA+PROBE: NOT DETECTED
SPECIMEN SOURCE: NORMAL

## 2020-12-20 ENCOUNTER — HEALTH MAINTENANCE LETTER (OUTPATIENT)
Age: 35
End: 2020-12-20

## 2021-04-18 ENCOUNTER — HEALTH MAINTENANCE LETTER (OUTPATIENT)
Age: 36
End: 2021-04-18

## 2021-10-03 ENCOUNTER — HEALTH MAINTENANCE LETTER (OUTPATIENT)
Age: 36
End: 2021-10-03

## 2022-01-22 ENCOUNTER — HEALTH MAINTENANCE LETTER (OUTPATIENT)
Age: 37
End: 2022-01-22

## 2022-04-05 ENCOUNTER — OFFICE VISIT (OUTPATIENT)
Dept: PEDIATRICS | Facility: OTHER | Age: 37
End: 2022-04-05
Attending: INTERNAL MEDICINE
Payer: COMMERCIAL

## 2022-04-05 VITALS
TEMPERATURE: 97.9 F | OXYGEN SATURATION: 98 % | DIASTOLIC BLOOD PRESSURE: 58 MMHG | SYSTOLIC BLOOD PRESSURE: 112 MMHG | HEART RATE: 44 BPM | RESPIRATION RATE: 16 BRPM | WEIGHT: 180 LBS | BODY MASS INDEX: 25.46 KG/M2

## 2022-04-05 DIAGNOSIS — R00.1 BRADYCARDIA: ICD-10-CM

## 2022-04-05 DIAGNOSIS — R00.2 HEART PALPITATIONS: Primary | ICD-10-CM

## 2022-04-05 DIAGNOSIS — I49.3 PVC'S (PREMATURE VENTRICULAR CONTRACTIONS): ICD-10-CM

## 2022-04-05 LAB
ANION GAP SERPL CALCULATED.3IONS-SCNC: 4 MMOL/L (ref 3–14)
ATRIAL RATE - MUSE: 69 BPM
BUN SERPL-MCNC: 16 MG/DL (ref 7–25)
CALCIUM SERPL-MCNC: 9.6 MG/DL (ref 8.6–10.3)
CHLORIDE BLD-SCNC: 103 MMOL/L (ref 98–107)
CO2 SERPL-SCNC: 30 MMOL/L (ref 21–31)
CREAT SERPL-MCNC: 0.81 MG/DL (ref 0.6–1.2)
DIASTOLIC BLOOD PRESSURE - MUSE: NORMAL MMHG
ERYTHROCYTE [DISTWIDTH] IN BLOOD BY AUTOMATED COUNT: 13.2 % (ref 10–15)
GFR SERPL CREATININE-BSD FRML MDRD: >90 ML/MIN/1.73M2
GLUCOSE BLD-MCNC: 95 MG/DL (ref 70–105)
HCT VFR BLD AUTO: 40.5 % (ref 35–47)
HGB BLD-MCNC: 13 G/DL (ref 11.7–15.7)
INTERPRETATION ECG - MUSE: NORMAL
MAGNESIUM SERPL-MCNC: 2.1 MG/DL (ref 1.9–2.7)
MCH RBC QN AUTO: 26.4 PG (ref 26.5–33)
MCHC RBC AUTO-ENTMCNC: 32.1 G/DL (ref 31.5–36.5)
MCV RBC AUTO: 82 FL (ref 78–100)
P AXIS - MUSE: 45 DEGREES
PLATELET # BLD AUTO: 310 10E3/UL (ref 150–450)
POTASSIUM BLD-SCNC: 4.5 MMOL/L (ref 3.5–5.1)
PR INTERVAL - MUSE: 156 MS
QRS DURATION - MUSE: 88 MS
QT - MUSE: 408 MS
QTC - MUSE: 437 MS
R AXIS - MUSE: 73 DEGREES
RBC # BLD AUTO: 4.93 10E6/UL (ref 3.8–5.2)
SODIUM SERPL-SCNC: 137 MMOL/L (ref 134–144)
SYSTOLIC BLOOD PRESSURE - MUSE: NORMAL MMHG
T AXIS - MUSE: 59 DEGREES
TSH SERPL DL<=0.005 MIU/L-ACNC: 3.62 MU/L (ref 0.4–4)
VENTRICULAR RATE- MUSE: 69 BPM
WBC # BLD AUTO: 6.6 10E3/UL (ref 4–11)

## 2022-04-05 PROCEDURE — 99214 OFFICE O/P EST MOD 30 MIN: CPT | Performed by: INTERNAL MEDICINE

## 2022-04-05 PROCEDURE — 83735 ASSAY OF MAGNESIUM: CPT | Mod: ZL | Performed by: INTERNAL MEDICINE

## 2022-04-05 PROCEDURE — G0463 HOSPITAL OUTPT CLINIC VISIT: HCPCS | Mod: 25

## 2022-04-05 PROCEDURE — 85027 COMPLETE CBC AUTOMATED: CPT | Mod: ZL | Performed by: INTERNAL MEDICINE

## 2022-04-05 PROCEDURE — G0463 HOSPITAL OUTPT CLINIC VISIT: HCPCS

## 2022-04-05 PROCEDURE — 93010 ELECTROCARDIOGRAM REPORT: CPT | Performed by: INTERNAL MEDICINE

## 2022-04-05 PROCEDURE — 80048 BASIC METABOLIC PNL TOTAL CA: CPT | Mod: ZL | Performed by: INTERNAL MEDICINE

## 2022-04-05 PROCEDURE — 84443 ASSAY THYROID STIM HORMONE: CPT | Mod: ZL | Performed by: INTERNAL MEDICINE

## 2022-04-05 PROCEDURE — 36415 COLL VENOUS BLD VENIPUNCTURE: CPT | Mod: ZL | Performed by: INTERNAL MEDICINE

## 2022-04-05 PROCEDURE — 93005 ELECTROCARDIOGRAM TRACING: CPT | Performed by: INTERNAL MEDICINE

## 2022-04-05 ASSESSMENT — ANXIETY QUESTIONNAIRES
5. BEING SO RESTLESS THAT IT IS HARD TO SIT STILL: NOT AT ALL
7. FEELING AFRAID AS IF SOMETHING AWFUL MIGHT HAPPEN: NOT AT ALL
1. FEELING NERVOUS, ANXIOUS, OR ON EDGE: NOT AT ALL
7. FEELING AFRAID AS IF SOMETHING AWFUL MIGHT HAPPEN: NOT AT ALL
6. BECOMING EASILY ANNOYED OR IRRITABLE: SEVERAL DAYS
2. NOT BEING ABLE TO STOP OR CONTROL WORRYING: NOT AT ALL
4. TROUBLE RELAXING: NOT AT ALL
GAD7 TOTAL SCORE: 1
3. WORRYING TOO MUCH ABOUT DIFFERENT THINGS: NOT AT ALL
GAD7 TOTAL SCORE: 1
GAD7 TOTAL SCORE: 1

## 2022-04-05 ASSESSMENT — PATIENT HEALTH QUESTIONNAIRE - PHQ9
10. IF YOU CHECKED OFF ANY PROBLEMS, HOW DIFFICULT HAVE THESE PROBLEMS MADE IT FOR YOU TO DO YOUR WORK, TAKE CARE OF THINGS AT HOME, OR GET ALONG WITH OTHER PEOPLE: NOT DIFFICULT AT ALL
SUM OF ALL RESPONSES TO PHQ QUESTIONS 1-9: 0
SUM OF ALL RESPONSES TO PHQ QUESTIONS 1-9: 0

## 2022-04-05 ASSESSMENT — PAIN SCALES - GENERAL: PAINLEVEL: NO PAIN (0)

## 2022-04-05 NOTE — PATIENT INSTRUCTIONS
-- Zio patch   -- Follow-up based on results    Patient Education     Premature Ventricular Contractions  Premature ventricular contractions (PVCs) are a type of arrhythmia (irregular heartbeat). They are common and can affect people of all ages. PVCs are almost never dangerous. But if other heart problems are present, PVCs can cause serious health issues. This sheet tells you more about PVCs and how they are treated.  Understanding Your Heart s Electrical System     During a normal heartbeat, electrical signals start at the SA node and are sent through the walls of the heart s chambers.   To understand how PVCs occur, it helps to first understand how your heart works. Your heart is a muscle that pumps blood throughout the body. It is made up of 4 chambers: 2 atria and 2 ventricles. Electrical signals are sent to these chambers, making them contract (squeeze) in a certain order. This rhythm, which pumps blood through and out of your heart, is your heartbeat. The process begins in the sinoatrial (SA) or sinus node. This is the heart's natural pacemaker:    The SA node. This group of cells in the right atrium sends a signal to both atria, telling them to contract. When the atria contract, blood is pumped into the ventricles.     The AV node. This is another group of cells in the right atrium. It receives the signal from the SA node after it passes through the atria. The AV node transmits the electrical signal from the atria to the ventricles.   What Happens During a PVC?  During a PVC, an abnormal signal disrupts the normal heartbeat. This signal comes from the ventricle instead of the SA node. The signal causes the ventricles to contract too soon, and the heart skips the next normal beat. This results in an irregular heartbeat.  What Are the Symptoms of PVCs?  Sometimes PVCs cause no symptoms at all. Other times, a patient may feel palpitations (irregular heartbeats). These can feel like  skipped  beats, or   flopping  in the chest. If PVCs are frequent, other symptoms can occur. These include tiredness, feeling faint, or shortness of breath. They also include fullness or pressure in the neck, and chest pain. These symptoms occur because less oxygen is delivered to the body. This is because PVCs make the heart pump blood less effectively.  What Causes PVCs?  In some cases, no cause of PVCs is found. When a cause is found, it is either chemical or structural:    Chemical. Changes in the body s chemistry can prompt PVCs. For instance, raised levels of certain hormones, such as adrenaline or thyroid, can cause PVCs. Consuming substances such as alcohol and caffeine can also cause them.    Structural. This involves existing problems in the heart and/or cardiovascular system. Coronary artery disease (CAD) is 1 type of problem that can be related to PVCs. Others are heart failure and heart valve problems.   How Are PVCs Diagnosed?  The doctor will take your medical history and ask you to describe your symptoms. You ll also have a physical exam. And certain tests may be done. These include:    Electrocardiogram (ECG or EKG). This test records the electrical activity of your heart. During an ECG, small pads (electrodes) are placed on your chest, arms, and legs. Wires connect the pads to a machine, which records your heart s electrical signals.    Heart monitor. There are 2 types of external heart monitors:  ? Holter monitor. This monitor can be worn for 1 to 7 days. It provides a constant recording of heart activity. After the test is done, your health care provider analyzes the recording.  ? Event monitors. These monitors can be used for 3 to 4 weeks. One kind is a memory loop recorder. This monitor records constantly, but stores the recording only when you press a button. The other kind is a credit card-sized recorder. This monitor is turned on only during an episode. With both types, you send the recordings of symptoms to  your health care provider over the phone.  How Are PVCs Treated?  Treatment depends on whether structural heart problems are present. It is also determined by the severity of symptoms:    If you have no other heart problems and your symptoms are not bothersome, treatment may not be needed. If it is needed, treatment can involve:  ? Lifestyle changes. Your doctor may suggest that you exercise and limit caffeine and alcohol. If you smoke, you ll be advised to quit.  ? Medications. Two types of medication can help with PVCs. Beta-blockers and calcium channel blockers both can lower the heart rate and reduce blood pressure.    If you have structural heart problems, you ll likely be referred to a doctor who specializes in heart rhythm problems. This may be a cardiologist or an electrophysiologist. An electrophysiologist is a cardiologist who specializes in the electrical system of the heart. You will need a referral because for you, PVCs can be a bigger threat to your health. Depending on the nature of your heart disease, you may need treatment for an underlying heart problem. In severe cases, an ICD (implantable cardioverter defibrillator) may be implanted. This is done to treat the underlying heart disease. It can help normalize the heart rhythm.  Date Last Reviewed: 3/7/2014    6700-3521 [employer]. [address] [place] [address], [place] [address]. All rights reserved. This information is not intended as a substitute for professional medical care. Always follow your healthcare professional's instructions.

## 2022-04-05 NOTE — PROGRESS NOTES
Assessment & Plan   1. Heart palpitations  She appears to have occasional PVCs on her EKG today.  This could explain all of the symptoms she has been experiencing however differential diagnosis also includes paroxysmal supraventricular tachycardia, paroxysmal ventricular tachycardia, paroxysmal atrial fibrillation, others.  I see no evidence for Cheng-Parkinson-White syndrome at this time.  I recommend lab work to look for electrolyte abnormalities, hyperthyroidism, significant anemia as outlined below.  Warning signs were discussed and prompt repeat evaluation recommended if symptoms worsen.  We will consider further testing based on event monitoring results.  - CBC with platelets; Future  - Basic metabolic panel; Future  - TSH; Future  - Leadless EKG Monitor 8 to 14 Days; Future  - Magnesium; Future  - CBC with platelets  - Basic metabolic panel  - TSH  - Magnesium    2. Bradycardia  - EKG 12-lead, tracing only    3. PVC's (premature ventricular contractions)  - CBC with platelets; Future  - Basic metabolic panel; Future  - TSH; Future  - Leadless EKG Monitor 8 to 14 Days; Future  - Magnesium; Future  - CBC with platelets  - Basic metabolic panel  - TSH  - Magnesium      Patient Instructions      -- Zio patch   -- Follow-up based on results    Patient Education     Premature Ventricular Contractions  Premature ventricular contractions (PVCs) are a type of arrhythmia (irregular heartbeat). They are common and can affect people of all ages. PVCs are almost never dangerous. But if other heart problems are present, PVCs can cause serious health issues. This sheet tells you more about PVCs and how they are treated.  Understanding Your Heart s Electrical System     During a normal heartbeat, electrical signals start at the SA node and are sent through the walls of the heart s chambers.   To understand how PVCs occur, it helps to first understand how your heart works. Your heart is a muscle that pumps blood throughout  the body. It is made up of 4 chambers: 2 atria and 2 ventricles. Electrical signals are sent to these chambers, making them contract (squeeze) in a certain order. This rhythm, which pumps blood through and out of your heart, is your heartbeat. The process begins in the sinoatrial (SA) or sinus node. This is the heart's natural pacemaker:    The SA node. This group of cells in the right atrium sends a signal to both atria, telling them to contract. When the atria contract, blood is pumped into the ventricles.     The AV node. This is another group of cells in the right atrium. It receives the signal from the SA node after it passes through the atria. The AV node transmits the electrical signal from the atria to the ventricles.   What Happens During a PVC?  During a PVC, an abnormal signal disrupts the normal heartbeat. This signal comes from the ventricle instead of the SA node. The signal causes the ventricles to contract too soon, and the heart skips the next normal beat. This results in an irregular heartbeat.  What Are the Symptoms of PVCs?  Sometimes PVCs cause no symptoms at all. Other times, a patient may feel palpitations (irregular heartbeats). These can feel like  skipped  beats, or  flopping  in the chest. If PVCs are frequent, other symptoms can occur. These include tiredness, feeling faint, or shortness of breath. They also include fullness or pressure in the neck, and chest pain. These symptoms occur because less oxygen is delivered to the body. This is because PVCs make the heart pump blood less effectively.  What Causes PVCs?  In some cases, no cause of PVCs is found. When a cause is found, it is either chemical or structural:    Chemical. Changes in the body s chemistry can prompt PVCs. For instance, raised levels of certain hormones, such as adrenaline or thyroid, can cause PVCs. Consuming substances such as alcohol and caffeine can also cause them.    Structural. This involves existing problems in  the heart and/or cardiovascular system. Coronary artery disease (CAD) is 1 type of problem that can be related to PVCs. Others are heart failure and heart valve problems.   How Are PVCs Diagnosed?  The doctor will take your medical history and ask you to describe your symptoms. You ll also have a physical exam. And certain tests may be done. These include:    Electrocardiogram (ECG or EKG). This test records the electrical activity of your heart. During an ECG, small pads (electrodes) are placed on your chest, arms, and legs. Wires connect the pads to a machine, which records your heart s electrical signals.    Heart monitor. There are 2 types of external heart monitors:  ? Holter monitor. This monitor can be worn for 1 to 7 days. It provides a constant recording of heart activity. After the test is done, your health care provider analyzes the recording.  ? Event monitors. These monitors can be used for 3 to 4 weeks. One kind is a memory loop recorder. This monitor records constantly, but stores the recording only when you press a button. The other kind is a credit card-sized recorder. This monitor is turned on only during an episode. With both types, you send the recordings of symptoms to your health care provider over the phone.  How Are PVCs Treated?  Treatment depends on whether structural heart problems are present. It is also determined by the severity of symptoms:    If you have no other heart problems and your symptoms are not bothersome, treatment may not be needed. If it is needed, treatment can involve:  ? Lifestyle changes. Your doctor may suggest that you exercise and limit caffeine and alcohol. If you smoke, you ll be advised to quit.  ? Medications. Two types of medication can help with PVCs. Beta-blockers and calcium channel blockers both can lower the heart rate and reduce blood pressure.    If you have structural heart problems, you ll likely be referred to a doctor who specializes in heart rhythm  problems. This may be a cardiologist or an electrophysiologist. An electrophysiologist is a cardiologist who specializes in the electrical system of the heart. You will need a referral because for you, PVCs can be a bigger threat to your health. Depending on the nature of your heart disease, you may need treatment for an underlying heart problem. In severe cases, an ICD (implantable cardioverter defibrillator) may be implanted. This is done to treat the underlying heart disease. It can help normalize the heart rhythm.  Date Last Reviewed: 3/7/2014    7178-5175 [employer]. [address] [place] [address]. All rights reserved. This information is not intended as a substitute for professional medical care. Always follow your healthcare professional's instructions.               Return if symptoms worsen or fail to improve.    Signed, Roman Benites MD, FAAP, FACP  Internal Medicine & Pediatrics    Subjective   Cassandra Kelly is a 37 year old female who presents for heart palpitations.  She has been having these off and on for several years.  They tend to be worse in the summertime when she is experiencing more stress with her job.  She describes them as feeling regularly irregular.  When they occur she can have a slight fluttering or heaviness in the chest that resolves with the palpitations.  No exertion based symptoms.  No syncope or presyncope.  Her family is more worried about these than she is.    Objective   Vitals: /58 (BP Location: Right arm, Patient Position: Sitting, Cuff Size: Adult Regular)   Pulse (!) 44   Temp 97.9  F (36.6  C) (Tympanic)   Resp 16   Wt 81.6 kg (180 lb)   LMP 04/02/2022 (Exact Date)   SpO2 98%   BMI 25.46 kg/m      Cardiovascular: Regularly irregular in a pattern of bigeminy.  No murmur.  Pulmonary: Clear    Review and Analysis of Data   I personally reviewed the following:  External notes: No  Results: Yes I reviewed her EKG today as well as  prior  Use of an independent historian: No  Independent review of a test performed by another physician: No  Discussion of management with another physician: No  Low risk of morbidity from additional diagnostic testing and/or treatment.

## 2022-04-05 NOTE — NURSING NOTE
"Patient presents to the clinic for cardiac conerns.    FOOD SECURITY SCREENING QUESTIONS:    The next two questions are to help us understand your food security.  If you are feeling you need any assistance in this area, we have resources available to support you today.    Hunger Vital Signs:  Within the past 12 months we worried whether our food would run out before we got money to buy more. Never  Within the past 12 months the food we bought just didn't last and we didn't have money to get more. Never    Advance Care Directive on file? no  Advance Care Directive provided to patient? Yes    Chief Complaint   Patient presents with     Heart Problem       Initial /58 (BP Location: Right arm, Patient Position: Sitting, Cuff Size: Adult Regular)   Pulse (!) 44   Temp 97.9  F (36.6  C) (Tympanic)   Resp 16   Wt 81.6 kg (180 lb)   LMP 04/02/2022 (Exact Date)   SpO2 98%   BMI 25.46 kg/m   Estimated body mass index is 25.46 kg/m  as calculated from the following:    Height as of 3/23/18: 1.791 m (5' 10.5\").    Weight as of this encounter: 81.6 kg (180 lb).  Medication Reconciliation: complete        Cherry Watson LPN'       "

## 2022-04-06 ASSESSMENT — PATIENT HEALTH QUESTIONNAIRE - PHQ9: SUM OF ALL RESPONSES TO PHQ QUESTIONS 1-9: 0

## 2022-04-06 ASSESSMENT — ANXIETY QUESTIONNAIRES: GAD7 TOTAL SCORE: 1

## 2022-04-11 ENCOUNTER — HOSPITAL ENCOUNTER (OUTPATIENT)
Dept: CARDIOLOGY | Facility: OTHER | Age: 37
Discharge: HOME | End: 2022-04-11
Attending: INTERNAL MEDICINE | Admitting: INTERNAL MEDICINE
Payer: COMMERCIAL

## 2022-04-11 DIAGNOSIS — I49.3 PVC'S (PREMATURE VENTRICULAR CONTRACTIONS): ICD-10-CM

## 2022-04-11 DIAGNOSIS — R00.2 HEART PALPITATIONS: ICD-10-CM

## 2022-04-11 PROCEDURE — 93246 EXT ECG>7D<15D RECORDING: CPT

## 2022-04-11 PROCEDURE — 93248 EXT ECG>7D<15D REV&INTERPJ: CPT | Performed by: INTERNAL MEDICINE

## 2022-04-11 NOTE — PATIENT INSTRUCTIONS
Date Placed on Pt:  4/11/2022    Patient instructed not to:   -take baths, swim, sauna   -remove device prior to 14 days   -use electric blankets   -shower or sweat excessively within first 24 hours of device application  Patient instructed to:   -shower as needed   -be carefull when toweling off and dressing   -press button when cardiac symptoms occur   -document symptoms in log book   -remove and return device (send via mail to [facility])   -Call [facility] with any questions

## 2022-09-04 ENCOUNTER — HEALTH MAINTENANCE LETTER (OUTPATIENT)
Age: 37
End: 2022-09-04

## 2023-04-29 ENCOUNTER — HEALTH MAINTENANCE LETTER (OUTPATIENT)
Age: 38
End: 2023-04-29

## 2023-07-01 ENCOUNTER — HOSPITAL ENCOUNTER (EMERGENCY)
Facility: OTHER | Age: 38
Discharge: HOME | End: 2023-07-01
Attending: STUDENT IN AN ORGANIZED HEALTH CARE EDUCATION/TRAINING PROGRAM | Admitting: STUDENT IN AN ORGANIZED HEALTH CARE EDUCATION/TRAINING PROGRAM
Payer: COMMERCIAL

## 2023-07-01 ENCOUNTER — APPOINTMENT (OUTPATIENT)
Dept: GENERAL RADIOLOGY | Facility: OTHER | Age: 38
End: 2023-07-01
Attending: INTERNAL MEDICINE
Payer: COMMERCIAL

## 2023-07-01 VITALS
HEIGHT: 70 IN | BODY MASS INDEX: 25.05 KG/M2 | WEIGHT: 175 LBS | OXYGEN SATURATION: 99 % | RESPIRATION RATE: 16 BRPM | SYSTOLIC BLOOD PRESSURE: 146 MMHG | HEART RATE: 62 BPM | TEMPERATURE: 98.3 F | DIASTOLIC BLOOD PRESSURE: 82 MMHG

## 2023-07-01 DIAGNOSIS — S93.401A SPRAIN OF RIGHT ANKLE, UNSPECIFIED LIGAMENT, INITIAL ENCOUNTER: ICD-10-CM

## 2023-07-01 PROCEDURE — 99283 EMERGENCY DEPT VISIT LOW MDM: CPT | Performed by: STUDENT IN AN ORGANIZED HEALTH CARE EDUCATION/TRAINING PROGRAM

## 2023-07-01 PROCEDURE — 99283 EMERGENCY DEPT VISIT LOW MDM: CPT

## 2023-07-01 PROCEDURE — 73610 X-RAY EXAM OF ANKLE: CPT | Mod: RT

## 2023-07-01 NOTE — ED PROVIDER NOTES
"Emergency Department Provider Note  : 1985 Age: 38 year old Sex: female MRN: [id number]    Chief Complaint   Patient presents with     Ankle Pain     Right ankle         Medical Decision Making / Assessment / Plan   38 year old female with a PMH of multiple prior ankle sprains who presents with what clinically appears consistent with a high-grade ankle sprain without concerning findings suggestive of occult fracture. X-rays negative formally read as negative. CMS intact.  Appropriate for walking boot placement and outpatient sports med follow-up.          The patient was informed of the plan and verbalized understanding and agreed with the plan. The patient was given strict return to Emergency Department precautions as well as appropriate follow up instructions. The patient was discharged in stable condition    New Prescriptions    No medications on file       Final diagnoses:   Sprain of right ankle, unspecified ligament, initial encounter       Julian Agrawal MD  2023   Emergency Department    Subjective   Cassandra is a 38 year old female with PMH of multiple prior ankle sprains who presents at  5:52 PM for evaluation of pain and swelling along her right ankle after jumping off a dock onto what she thought was a flat ground.  Landed on a rock with her right foot and rolled her ankle.  Denies other trauma or fall.  Immediately developed pain and swelling making it difficult to ambulate on her right foot.    I have reviewed the Medications, Allergies, Past Medical and Surgical History, and Social History in the [facility] and with family.    Review of Systems:  Please see HPI for pertinent positives and negatives. All other systems reviewed and found to be negative.      Objective   BP: (!) 146/82  Pulse: 62  Temp: 98.3  F (36.8  C)  Resp: 16  Height: 177.8 cm (5' 10\")  Weight: 79.4 kg (175 lb)  SpO2: 99 %    Physical Exam:   Gen: Comfortable. NAD  HEENT: MMM. AT/NC.  Eye: EOMI.   CV:  Well-perfused " right lower extremity.  Intact DP and PT pulse.  Pulm: Nonlabored, equal chest rise  Abd: ND.   Ext:  Moderate amount of edema and contusion over the right ankle inferior and anterior to the lateral malleolus.  Nontender over the malleoli bilaterally or calcaneus.  Pain with resisted eversion and inversion.  Neuro: AOx3, no focal deficit noted  Psych: Appropriate affect, cognition intact    Procedures / Critical Care   Procedures    Critical Care Time: none         Medical/Surgical History:  Past Medical History:   Diagnosis Date     Personal history of other medical treatment (CODE)       x 3     Past Surgical History:   Procedure Laterality Date     BACK SURGERY      No Comments Provided     DILATION AND CURETTAGE      for miscarage     TUBAL LIGATION  2015    Bilateral, also removal of intrauterine device       Medications:  No current facility-administered medications for this encounter.     No current outpatient medications on file.       Allergies:  Sulfa antibiotics    Relevant labs, images, EKGs, Epic and outside hospital (if applicable) charts were reviewed. The findings, diagnosis, plan, and need for follow up were discussed with the patient/family. Nursing notes were reviewed.      Julian Agrawal MD  23 [id number]

## 2023-07-01 NOTE — ED TRIAGE NOTES
"Pt is here today with her  due to a right ankle injury.   Pt was standing on a tipped over a dock.  She She jumped off the dock, into tall grass.  Her right foot landed on a rock and twisted.  CMS is intact.     .BP (!) 146/82   Pulse 62   Temp 98.3  F (36.8  C)   Resp 16   Ht 1.778 m (5' 10\")   Wt 79.4 kg (175 lb)   SpO2 99%   BMI 25.11 kg/m    Ann Jackson RN on 7/1/2023 at 5:50 PM       Triage Assessment     Row Name 07/01/23 [id number]       Triage Assessment (Adult)    Airway WDL WDL       Respiratory WDL    Respiratory WDL WDL       Skin Circulation/Temperature WDL    Skin Circulation/Temperature WDL WDL       Cardiac WDL    Cardiac WDL WDL       Peripheral/Neurovascular WDL    Peripheral Neurovascular WDL WDL       Cognitive/Neuro/Behavioral WDL    Cognitive/Neuro/Behavioral WDL WDL              "

## 2023-07-01 NOTE — DISCHARGE INSTRUCTIONS
We discussed, use anti-inflammatories like ibuprofen Tylenol in addition to ice for management of the pain and swelling related to your ankle sprain.  Follow-up with sports medicine or your primary care provider if symptoms not improving within the next 1 to 2 weeks.

## 2024-02-17 ENCOUNTER — HEALTH MAINTENANCE LETTER (OUTPATIENT)
Age: 39
End: 2024-02-17

## 2024-07-06 ENCOUNTER — HEALTH MAINTENANCE LETTER (OUTPATIENT)
Age: 39
End: 2024-07-06

## 2025-07-13 ENCOUNTER — HEALTH MAINTENANCE LETTER (OUTPATIENT)
Age: 40
End: 2025-07-13